# Patient Record
Sex: FEMALE | Race: WHITE | HISPANIC OR LATINO | Employment: FULL TIME | URBAN - METROPOLITAN AREA
[De-identification: names, ages, dates, MRNs, and addresses within clinical notes are randomized per-mention and may not be internally consistent; named-entity substitution may affect disease eponyms.]

---

## 2023-09-27 ENCOUNTER — OFFICE VISIT (OUTPATIENT)
Dept: FAMILY MEDICINE CLINIC | Facility: CLINIC | Age: 28
End: 2023-09-27
Payer: COMMERCIAL

## 2023-09-27 VITALS
OXYGEN SATURATION: 99 % | BODY MASS INDEX: 26.82 KG/M2 | HEIGHT: 60 IN | SYSTOLIC BLOOD PRESSURE: 100 MMHG | WEIGHT: 136.6 LBS | HEART RATE: 84 BPM | DIASTOLIC BLOOD PRESSURE: 68 MMHG | TEMPERATURE: 98 F

## 2023-09-27 DIAGNOSIS — M51.26 LUMBAR DISC HERNIATION: Primary | ICD-10-CM

## 2023-09-27 DIAGNOSIS — E61.1 IRON DEFICIENCY: ICD-10-CM

## 2023-09-27 DIAGNOSIS — E55.9 VITAMIN D DEFICIENCY: ICD-10-CM

## 2023-09-27 DIAGNOSIS — F41.1 GAD (GENERALIZED ANXIETY DISORDER): ICD-10-CM

## 2023-09-27 PROBLEM — Z83.79 FAMILY HISTORY OF CHRONIC LIVER DISEASE: Status: ACTIVE | Noted: 2023-09-27

## 2023-09-27 PROBLEM — Z87.39 HISTORY OF BACK PAIN: Status: ACTIVE | Noted: 2023-09-27

## 2023-09-27 PROCEDURE — 99204 OFFICE O/P NEW MOD 45 MIN: CPT | Performed by: FAMILY MEDICINE

## 2023-09-27 RX ORDER — BUPROPION HYDROCHLORIDE 150 MG/1
1 TABLET ORAL DAILY
COMMUNITY
Start: 2023-07-26

## 2023-09-27 RX ORDER — BUSPIRONE HYDROCHLORIDE 10 MG/1
5 TABLET ORAL 2 TIMES DAILY
COMMUNITY
Start: 2023-07-26

## 2023-09-27 NOTE — PROGRESS NOTES
Chief Complaint  Back Pain (NEW PATIENT ESTABLISH CARE MOVED HERE FROM [place] PAST HISTORY OF BACK PAIN AND LOW VITAMIN D AND FERRITIN) and Vitamin D Deficiency    New patient visit  Here to get established and needs follow-up on low back pain/disc herniation and history of vitamin D deficiency and low ferritin.    Recently moved from [place] to [place] for new employment/job transfer approximately 9 months ago, January 2023.  She has established herself here with a new psychiatrist, treatment for GILSON with both Wellbutrin and BuSpar.  No recent med changes.  Things have been stable.    Prior PCP was in [place], last seen a little over 1 year ago/July 2022.   That visit was for uncontrolled low back pain and routine lab work??  Records unavailable.  However, she does have a report of multiple labs and an MRI of her LS spine on her phone.  Apparently numerous labs and an MRI of LS spine was ordered by her PCP over 1 year ago, but no follow-up??  She never called them to follow-up on results either??    Last year she was having uncontrolled low back pain with radiation into her left leg.  She had failed conservative treatment which included physical therapy and medications.  Thus, when she last saw her PCP an MRI of her LS spine was ordered but again no follow-up done.  She subsequently moved to [place] for her new job.    Currently, she is doing well.  Settled into [place], new job is going fine.  Maintains a very healthy and active lifestyle.  Sleep is good.  Mood has been fine with medications.    Still complains of low back pain on most days.  However, with very little radicular pain anymore.  Still have some radiation into her lower extremities, but not nearly as intense or as often.  No further experiencing numbness.  No change in bowel or bladder.  No weakness.  She would possibly like to restart physical therapy?  Declines medication.    Otherwise, requesting to review all her lab results that she has on  "her phone from over 1 year ago, July 2022.  Reports her weight to be stable.  Normal menses.  No abdominal pain, nausea or vomiting.  No increased fatigue.  Compliant with and tolerates current medications without side effects.        Review of Systems   Constitutional:  Negative for fever and unexpected weight change.   Respiratory:  Negative for cough and shortness of breath.    Cardiovascular:  Negative for chest pain.   Musculoskeletal:  Positive for back pain.      Subjective          Bety Braajas presents to [facility]    Objective   Vital Signs:   Vitals:    09/27/23 1014   BP: 100/68   BP Location: Right arm   Patient Position: Sitting   Cuff Size: Adult   Pulse: 84   Temp: 98 °F (36.7 °C)   SpO2: 99%   Weight: 62 kg (136 lb 9.6 oz)   Height: 152.4 cm (60\")      Body mass index is 26.68 kg/m².   Physical Exam  Vitals and nursing note reviewed.   Constitutional:       Appearance: Normal appearance. She is well-developed.   HENT:      Head: Normocephalic and atraumatic.      Nose: Nose normal.   Eyes:      Conjunctiva/sclera: Conjunctivae normal.      Pupils: Pupils are equal, round, and reactive to light.   Neck:      Thyroid: No thyromegaly.   Cardiovascular:      Rate and Rhythm: Normal rate and regular rhythm.      Heart sounds: Normal heart sounds. No murmur heard.  Pulmonary:      Effort: Pulmonary effort is normal.      Breath sounds: Normal breath sounds.   Abdominal:      General: Abdomen is flat. Bowel sounds are normal. There is no distension.      Palpations: Abdomen is soft. There is no hepatomegaly, splenomegaly or mass.      Tenderness: There is no abdominal tenderness. There is no guarding or rebound.      Hernia: No hernia is present.   Musculoskeletal:         General: Normal range of motion.      Cervical back: Normal range of motion and neck supple.      Right lower leg: No edema.      Left lower leg: No edema.      Comments: Slight paraspinal tenderness " "bilaterally  Negative straight leg raise bilaterally   Normal strength and reflexes bilateral lower extremities     Lymphadenopathy:      Cervical: No cervical adenopathy.   Skin:     General: Skin is warm.   Neurological:      General: No focal deficit present.      Mental Status: She is alert.   Psychiatric:         Mood and Affect: Mood normal.         Behavior: Behavior normal.         Thought Content: Thought content normal.         Judgment: Judgment normal.      Result Review :         No results found for: ANADIRECT, FERRITIN, TESTOSTEROTT, [id number], FOLATE, RF, BNP     July 2022 --- outside labs reviewed from patient's phone (no records available) --- CBC, CMP thyroid profile, thyroid-stimulating antibodies, thyroid peroxidase antibodies, iron studies all WNL.  Ferritin level slightly low at 13, vitamin D level slightly low at 26    Also has MRI report of her lumbar spine on the phone from July 2022 ----3 mm disc protrusion/herniation at L4/L5 impinging on the thecal sac           Assessment and Plan    Diagnoses and all orders for this visit:    1. Lumbar disc herniation (Primary)  --- new Dx L4/L5 disc herniation impinging on the thecal sac?  This report is from over 1 year ago/July 2022, MRI of LS spine (does not have records available, this is a report on her phone?)  Need records, release has been signed.  Also she plans to upload the report into my chart.  Clinically appears to be doing much better, far less radicular pain and numbness.  Would possibly like to start physical therapy, yet to disc herniation??  This may have been an \"over read?\"  Referral to neurosurgeon for eval and treatment.  Likely okay to proceed with conservative care only  -     Ambulatory Referral to Neurosurgery    2. Vitamin D deficiency --mild.  Asymptomatic.  May start OTC vitamin D 2000 units daily during winter months.  Plan to recheck vitamin D at follow-up in 4 to 6 months.  Need records.    3. Iron deficiency --- new " Dx.  Mild.  I start OTC iron supplementation 1 daily.  Recheck labs at follow-up in 4 to 6 months    4. GILSON (generalized anxiety disorder) --- controlled, per psychiatrist        Follow Up   Return in about 4 months (around 1/27/2024) for Annual physical.  Patient was given instructions and counseling regarding her condition or for health maintenance advice. Please see specific information pulled into the AVS if appropriate.

## 2023-09-27 NOTE — PATIENT INSTRUCTIONS
Need records    Referral to neurosurgeon    Please download MRI of LS spine to the media, as well as imaging reports as the neurosurgeon will need this.

## 2023-09-28 PROBLEM — F41.8 ANXIETY WITH DEPRESSION: Status: ACTIVE | Noted: 2023-09-28

## 2023-09-28 PROBLEM — E61.1 IRON DEFICIENCY: Status: ACTIVE | Noted: 2023-09-28

## 2023-12-28 ENCOUNTER — OFFICE VISIT (OUTPATIENT)
Dept: FAMILY MEDICINE CLINIC | Facility: CLINIC | Age: 28
End: 2023-12-28
Payer: COMMERCIAL

## 2023-12-28 VITALS
WEIGHT: 135 LBS | BODY MASS INDEX: 26.5 KG/M2 | TEMPERATURE: 97.8 F | HEART RATE: 78 BPM | OXYGEN SATURATION: 98 % | HEIGHT: 60 IN | DIASTOLIC BLOOD PRESSURE: 78 MMHG | SYSTOLIC BLOOD PRESSURE: 122 MMHG

## 2023-12-28 DIAGNOSIS — U07.1 UPPER RESPIRATORY TRACT INFECTION DUE TO COVID-19 VIRUS: Primary | ICD-10-CM

## 2023-12-28 DIAGNOSIS — J06.9 UPPER RESPIRATORY TRACT INFECTION DUE TO COVID-19 VIRUS: Primary | ICD-10-CM

## 2023-12-28 DIAGNOSIS — R48.1 LOSS OF PERCEPTION FOR TASTE: ICD-10-CM

## 2023-12-28 DIAGNOSIS — R09.81 CONGESTION OF NASAL SINUS: ICD-10-CM

## 2023-12-28 LAB
EXPIRATION DATE: NORMAL
EXPIRATION DATE: NORMAL
FLUAV AG NPH QL: NEGATIVE
FLUBV AG NPH QL: NEGATIVE
INTERNAL CONTROL: NORMAL
INTERNAL CONTROL: NORMAL
Lab: NORMAL
Lab: NORMAL
SARS-COV-2 AG UPPER RESP QL IA.RAPID: NOT DETECTED

## 2023-12-28 NOTE — PROGRESS NOTES
"Chief Complaint  Nasal Congestion ( Nasal congestion and loss smell and taste  since 4 days ago chill but no fever )    Subjective        Bety Barajas presents to [facility]  History of Present Illness  Pleasant 28-year-old female here for symptoms that started with a sore throat 4 days ago, progressed to copious congestion and then yesterday with a loss of taste and smell with symptoms of malaise.  No fevers.  She was recently visited by her partner who lives in [place].    Objective   Vital Signs:  /78   Pulse 78   Temp 97.8 °F (36.6 °C)   Ht 152.4 cm (60\")   Wt 61.2 kg (135 lb)   SpO2 98%   BMI 26.37 kg/m²   Estimated body mass index is 26.37 kg/m² as calculated from the following:    Height as of this encounter: 152.4 cm (60\").    Weight as of this encounter: 61.2 kg (135 lb).       BMI is >= 25 and <30. (Overweight) The following options were offered after discussion;: exercise counseling/recommendations and nutrition counseling/recommendations      Physical Exam  Vitals and nursing note reviewed.   Constitutional:       General: She is not in acute distress.     Appearance: She is well-developed.   HENT:      Head: Normocephalic.      Nose: Nose normal.   Cardiovascular:      Rate and Rhythm: Normal rate and regular rhythm.      Heart sounds: Normal heart sounds. No murmur heard.  Pulmonary:      Effort: Pulmonary effort is normal. No respiratory distress.      Breath sounds: Normal breath sounds.   Musculoskeletal:         General: Normal range of motion.   Skin:     General: Skin is warm and dry.      Findings: No rash.   Neurological:      Mental Status: She is alert and oriented to person, place, and time.   Psychiatric:         Behavior: Behavior normal.         Thought Content: Thought content normal.         Judgment: Judgment normal.        Result Review :  The following data was reviewed by: Brinda Avery MD on 12/28/2023:      Office Visit on 12/28/2023 "   Component Date Value Ref Range Status    SARS Antigen 12/28/2023 Not Detected  Not Detected, Presumptive Negative Final    Internal Control 12/28/2023 Passed  Passed Final    Lot Number 12/28/2023 [id number]   Final    Expiration Date 12/28/2023 2025-12-07   Final    Rapid Influenza A Ag 12/28/2023 Negative  Negative Final    Rapid Influenza B Ag 12/28/2023 Negative  Negative Final    Internal Control 12/28/2023 Passed  Passed Final    Lot Number 12/28/2023 [id number]   Final    Expiration Date 12/28/2023 2,025-12   Final                Assessment and Plan   Diagnoses and all orders for this visit:    1. Upper respiratory tract infection due to COVID-19 virus (Primary)    2. Congestion of nasal sinus  -     POCT SARS-CoV-2 Antigen CRISTHIAN  -     POCT Influenza A/B    3. Loss of perception for taste    Pleasant 28-year-old female here to follow-up for upper respiratory symptoms that have been present for about 4 days, although her COVID test is negative she does have other clinical symptoms of COVID-19.  Therefore I would recommend that she maintain quarantine and according to current guidelines.  Continue with Mucinex, [place] pot and OTC meds as she is now.       Follow Up   Return if symptoms worsen or fail to improve.  Patient was given instructions and counseling regarding her condition or for health maintenance advice. Please see specific information pulled into the AVS if appropriate.     Brinda Avery MD

## 2023-12-28 NOTE — Clinical Note
Please approve work note:   OK to remain off work for covid 19, excused and Ok to return to work 1/3/23.  If there are further questions please do not hesitate to let me know.     Thanks,

## 2024-01-24 ENCOUNTER — OFFICE VISIT (OUTPATIENT)
Dept: FAMILY MEDICINE CLINIC | Facility: CLINIC | Age: 29
End: 2024-01-24
Payer: COMMERCIAL

## 2024-01-24 VITALS
DIASTOLIC BLOOD PRESSURE: 62 MMHG | WEIGHT: 139.8 LBS | TEMPERATURE: 97.3 F | HEART RATE: 82 BPM | OXYGEN SATURATION: 99 % | SYSTOLIC BLOOD PRESSURE: 110 MMHG | BODY MASS INDEX: 27.45 KG/M2 | HEIGHT: 60 IN

## 2024-01-24 DIAGNOSIS — Z00.00 WELLNESS EXAMINATION: Primary | ICD-10-CM

## 2024-01-24 DIAGNOSIS — M51.26 LUMBAR DISC HERNIATION: ICD-10-CM

## 2024-01-24 DIAGNOSIS — F41.8 ANXIETY WITH DEPRESSION: ICD-10-CM

## 2024-01-24 DIAGNOSIS — E61.1 IRON DEFICIENCY: ICD-10-CM

## 2024-01-24 DIAGNOSIS — E55.9 VITAMIN D DEFICIENCY: ICD-10-CM

## 2024-01-24 PROCEDURE — 99395 PREV VISIT EST AGE 18-39: CPT | Performed by: FAMILY MEDICINE

## 2024-01-24 NOTE — PATIENT INSTRUCTIONS
Received COVID-vaccine from pharmacy in near future    Return for fasting lab work    Recommend low fat/low calorie diet and exercise greater than 150 minutes of cardio per week.      Continue current treatment plan.

## 2024-01-24 NOTE — PROGRESS NOTES
Chief Complaint  Annual Exam (Yearly wellness has GYN for female wellness would like to get some labs vitamin levels)    NEEDS ANNUAL WELLNESS  And  3 to 4-month follow-up for new patient visit, lumbar disc herniation?  And vitamin deficiencies.    LOV with me in September to get established, discussions regarding chronic lumbar pain/possible disc herniation?  And vitamin deficiency history.  No records available in media or on epic.  However, reviewed her labs from July 2022 and a lumbar MRI report from over 1 year ago on her phone.  She was planning on downloading the reports into epic, but never got this completed.  -- Requested referral to see neurosurgeon, this was placed.  Referral was made to Dr. Arnold but she tells me she had to reschedule the appointment?  Nothing has been rescheduled thus far.  -- Started on OTC iron 1 p.o. daily and OTC vitamin D 2000 units daily for mild vitamin deficiencies.    Overall, doing better.  States her chronic low back pain has significantly improved since she has started an online exercise program that focuses on core strengthening.  No longer having radicular pain.  Not needing any medication.  Never did see neurosurgeon.  Still no MRI report available in epic?    Recently seen at urgent care center last month for COVID.  Recovered uneventfully.  She has started both OTC iron and vitamin D.  Tries to maintain a healthy lifestyle overall, low-cholesterol/low-carb diet and regular cardio exercise.  Reports her weight to be stable.  Mood good.  Sleep has been adequate.    Still sees psychiatrist regarding GILSON/depression.  Although, no longer taking Wellbutrin.  Apparently earlier this month she could not get this filled from the pharmacy and therefore decided to wean herself off the Wellbutrin.  She thinks she is doing much better since being off Wellbutrin (has been off medication completely x 4 weeks now.)  Only taking BuSpar 5 mg twice daily.  Has follow-up with  "psychiatrist next month.    Otherwise, no new complaints or concerns.  Compliant with and tolerating BuSpar without side effects.    Routine health maintenance/screening test:  PAP --- has gynecologist, recent Pap smear completed  MAMMO --- nonapplicable  DEXA --- not applicable  Colorectal Screen --- nonapplicable, family history negative for colon cancer  Vaccines --- not up-to-date  Smoking/ETOH Status --- non-smoker, social EtOH  Dentist, Eye Exam, Derm --- maintains regular dental visits, eye exam, no dermatologist  Diet/Exercise --- tries to maintain a healthy well-balanced diet and regular cardio exercise  Pertinent FH --- significant for liver disease/mother, diabetes/father, negative for colon cancer, breast cancer, premature CAD    Review of Systems   Constitutional:  Negative for fever and unexpected weight change.   Respiratory:  Negative for cough and shortness of breath.         Subjective          Bety Barajas presents to [facility]    Objective   Vital Signs:   Vitals:    01/24/24 1526   BP: 110/62   BP Location: Right arm   Patient Position: Sitting   Cuff Size: Adult   Pulse: 82   Temp: 97.3 °F (36.3 °C)   SpO2: 99%   Weight: 63.4 kg (139 lb 12.8 oz)   Height: 152.4 cm (60\")      Body mass index is 27.3 kg/m².   Physical Exam  Vitals and nursing note reviewed.   Constitutional:       Appearance: Normal appearance. She is well-developed.   HENT:      Head: Normocephalic and atraumatic.      Nose: Nose normal.   Eyes:      Conjunctiva/sclera: Conjunctivae normal.      Pupils: Pupils are equal, round, and reactive to light.   Neck:      Thyroid: No thyromegaly.   Cardiovascular:      Rate and Rhythm: Normal rate and regular rhythm.      Heart sounds: Normal heart sounds. No murmur heard.  Pulmonary:      Effort: Pulmonary effort is normal.      Breath sounds: Normal breath sounds.   Abdominal:      General: Abdomen is flat. Bowel sounds are normal. There is no distension. " "     Palpations: Abdomen is soft. There is no hepatomegaly, splenomegaly or mass.      Tenderness: There is no abdominal tenderness. There is no guarding or rebound.      Hernia: No hernia is present.   Musculoskeletal:         General: Normal range of motion.      Cervical back: Normal range of motion and neck supple.      Right lower leg: No edema.      Left lower leg: No edema.   Lymphadenopathy:      Cervical: No cervical adenopathy.   Skin:     General: Skin is warm.      Comments: No dysplastic or abnormal lesions   Neurological:      General: No focal deficit present.      Mental Status: She is alert.   Psychiatric:         Mood and Affect: Mood normal.         Behavior: Behavior normal.         Thought Content: Thought content normal.         Judgment: Judgment normal.        Result Review :         No results found for: \"ANADIRECT\", \"FERRITIN\", \"TESTOSTEROTT\", \"[id number]\", \"FOLATE\", \"RF\", \"BNP\"     Records unavailable, however reviewed lab work from patient's phone from July 2022 (out-of-[facility], [place] prior PCP)--- all WNL except for mildly low iron and vitamin D           Assessment and Plan    Diagnoses and all orders for this visit:    1. Wellness examination (Primary) --within normal limits  Has gynecologist, Pap smear up-to-date.  Needs screening includes: Screening lipid profile, CBC, CMP, hep C antibody screen, influenza vaccine, Tdap vaccine, and COVID booster (defers today, would like to get from pharmacy in near future.)  Otherwise, continue to improve upon healthy lifestyle --Needs low-carb/low calorie/low cholesterol diet and increase cardio exercise to greater than 150 minutes weekly   -     CBC & Differential; Future  -     Comprehensive Metabolic Panel; Future  -     Lipid Panel With LDL / HDL Ratio; Future  -     Hepatitis C Antibody; Future    2. Lumbar disc herniation --clinically resolved.  No radicular pain.  Records unavailable.  Have asked her to obtain the actual disc of her lumbar MRI " should she choose to see neurosurgeon, referral placed at last visit.  Doing much better with core strengthening exercises.    3. Iron deficiency --mild  Need to repeat lab work for monitoring, currently on OTC iron 1 p.o. daily  -     Iron and TIBC; Future  -     Ferritin; Future    4. Vitamin D deficiency -mild  Repeat lab work, currently on OTC vitamin D 2000 units daily  -     Vitamin D,25-Hydroxy; Future    5. Anxiety with depression --controlled  Doing well off Wellbutrin x 1 month.  See above HPI.  Continue BuSpar 5 mg twice daily.  Follow-up with psychiatrist as planned.  Check TSH  -     TSH; Future        Follow Up   Return in about 1 year (around 1/24/2025) for Annual physical, 1 year follow-up if all labs WNL and doing well.  Patient was given instructions and counseling regarding her condition or for health maintenance advice. Please see specific information pulled into the AVS if appropriate.

## 2024-04-30 ENCOUNTER — OFFICE VISIT (OUTPATIENT)
Dept: FAMILY MEDICINE CLINIC | Facility: CLINIC | Age: 29
End: 2024-04-30
Payer: COMMERCIAL

## 2024-04-30 VITALS
HEIGHT: 60 IN | BODY MASS INDEX: 28.82 KG/M2 | HEART RATE: 95 BPM | WEIGHT: 146.8 LBS | OXYGEN SATURATION: 95 % | SYSTOLIC BLOOD PRESSURE: 96 MMHG | DIASTOLIC BLOOD PRESSURE: 78 MMHG | RESPIRATION RATE: 16 BRPM | TEMPERATURE: 97.8 F

## 2024-04-30 DIAGNOSIS — J11.1 INFLUENZA: ICD-10-CM

## 2024-04-30 DIAGNOSIS — R68.89 FLU-LIKE SYMPTOMS: Primary | ICD-10-CM

## 2024-04-30 DIAGNOSIS — U07.1 RESPIRATORY TRACT INFECTION DUE TO COVID-19 VIRUS: ICD-10-CM

## 2024-04-30 DIAGNOSIS — J98.8 RESPIRATORY TRACT INFECTION DUE TO COVID-19 VIRUS: ICD-10-CM

## 2024-04-30 LAB
EXPIRATION DATE: ABNORMAL
EXPIRATION DATE: ABNORMAL
FLUAV AG NPH QL: POSITIVE
FLUBV AG NPH QL: NEGATIVE
INTERNAL CONTROL: ABNORMAL
INTERNAL CONTROL: ABNORMAL
Lab: ABNORMAL
Lab: ABNORMAL
SARS-COV-2 AG UPPER RESP QL IA.RAPID: DETECTED

## 2024-04-30 PROCEDURE — 87804 INFLUENZA ASSAY W/OPTIC: CPT | Performed by: FAMILY MEDICINE

## 2024-04-30 PROCEDURE — 87426 SARSCOV CORONAVIRUS AG IA: CPT | Performed by: FAMILY MEDICINE

## 2024-04-30 PROCEDURE — 99213 OFFICE O/P EST LOW 20 MIN: CPT | Performed by: FAMILY MEDICINE

## 2024-04-30 RX ORDER — OSELTAMIVIR PHOSPHATE 75 MG/1
75 CAPSULE ORAL 2 TIMES DAILY
Qty: 10 CAPSULE | Refills: 0 | Status: SHIPPED | OUTPATIENT
Start: 2024-04-30 | End: 2024-05-05

## 2024-04-30 NOTE — PROGRESS NOTES
Subjective     Bety Barajas is a 28 y.o. female.     Chief Complaint   Patient presents with    Fever     Since Saturday congestion. Had body aches and chills, no taste or smell.     Headache     Pressure around head.       Fever   This is a new problem. Episode onset: 3 days. The problem has been gradually worsening. Associated symptoms include congestion, coughing, ear pain, headaches, muscle aches and nausea. Associated symptoms comments: Loss of taste and smell .      Labs has been ordered and personally/independently interpreted , discussed with pt          The following portions of the patient's history were reviewed and updated as appropriate: allergies, current medications, past family history, past medical history, past social history, past surgical history, and problem list.        Review of Systems   Constitutional:  Positive for chills and fever.   HENT:  Positive for congestion and ear pain.    Respiratory:  Positive for cough.    Gastrointestinal:  Positive for nausea.       Vitals:    04/30/24 1339   BP: 96/78   Pulse: 95   Resp: 16   Temp: 97.8 °F (36.6 °C)   SpO2: 95%           04/30/24  1339   Weight: 66.6 kg (146 lb 12.8 oz)         Body mass index is 28.67 kg/m².      Current Outpatient Medications   Medication Sig Dispense Refill    busPIRone (BUSPAR) 10 MG tablet Take 0.5 tablets by mouth 2 (Two) Times a Day. TAKES 1/2 QAM AND 1 AND 1/2 QHS      oseltamivir (Tamiflu) 75 MG capsule Take 1 capsule by mouth 2 (Two) Times a Day for 5 days. 10 capsule 0     No current facility-administered medications for this visit.                Objective   Physical Exam  Vitals and nursing note reviewed.   Constitutional:       General: She is not in acute distress.     Appearance: She is not ill-appearing, toxic-appearing or diaphoretic.   Cardiovascular:      Heart sounds: Normal heart sounds.   Neurological:      Mental Status: She is alert and oriented to person, place, and time.   Psychiatric:         Mood  and Affect: Mood normal.         Behavior: Behavior normal.         Thought Content: Thought content normal.           Assessment & Plan   Diagnoses and all orders for this visit:    1. Flu-like symptoms (Primary)  Comments:  +ve covid and flu  Orders:  -     POCT Influenza A/B  -     POCT CAITY SARS-CoV-2 Antigen CRISTHIAN    2. Influenza  -     oseltamivir (Tamiflu) 75 MG capsule; Take 1 capsule by mouth 2 (Two) Times a Day for 5 days.  Dispense: 10 capsule; Refill: 0    3. Respiratory tract infection due to COVID-19 virus  Comments:  declined Rx with paxlovid as she had reaction to it      - Discussed supportive care , plenty of fluids     Patient was given instructions and counseling regarding her condition or for health maintenance advice. Please see specific information pulled into the AVS if appropriate.       I have fully discussed the nature of the medical condition(s) risks, complications, management, safe and proper use of medications.   Pt stated no allergy to the above prescribed medication.  I have discussed the SIDE EFFECT OF MEDICATION and importance TO report any side effect , the patient expressed good understanding.  Encouraged medication compliance and the importance of keeping scheduled follow up appointments with me and any other providers.    Patient instructed to follow up with our office for results on any labs/imaging ordered during this visit.    Home care discussed  All questions answered  Patient verbalizes understanding and agrees to treatment plan.     Follow up: Return for if no better or worsening symptoms.